# Patient Record
Sex: MALE | Race: OTHER
[De-identification: names, ages, dates, MRNs, and addresses within clinical notes are randomized per-mention and may not be internally consistent; named-entity substitution may affect disease eponyms.]

---

## 2018-10-22 ENCOUNTER — HOSPITAL ENCOUNTER (EMERGENCY)
Dept: HOSPITAL 62 - ER | Age: 38
Discharge: LEFT BEFORE DISCHARGE | End: 2018-10-22

## 2018-10-22 VITALS — DIASTOLIC BLOOD PRESSURE: 80 MMHG | SYSTOLIC BLOOD PRESSURE: 137 MMHG

## 2018-10-22 DIAGNOSIS — Z53.21: Primary | ICD-10-CM

## 2018-10-22 PROCEDURE — 93010 ELECTROCARDIOGRAM REPORT: CPT

## 2018-10-22 PROCEDURE — 93005 ELECTROCARDIOGRAM TRACING: CPT

## 2018-10-23 ENCOUNTER — HOSPITAL ENCOUNTER (EMERGENCY)
Dept: HOSPITAL 62 - ER | Age: 38
Discharge: HOME | End: 2018-10-23
Payer: SELF-PAY

## 2018-10-23 VITALS — SYSTOLIC BLOOD PRESSURE: 118 MMHG | DIASTOLIC BLOOD PRESSURE: 67 MMHG

## 2018-10-23 DIAGNOSIS — K29.00: Primary | ICD-10-CM

## 2018-10-23 DIAGNOSIS — T47.1X6A: ICD-10-CM

## 2018-10-23 DIAGNOSIS — F17.200: ICD-10-CM

## 2018-10-23 DIAGNOSIS — Z91.14: ICD-10-CM

## 2018-10-23 DIAGNOSIS — K21.9: ICD-10-CM

## 2018-10-23 DIAGNOSIS — Z91.128: ICD-10-CM

## 2018-10-23 DIAGNOSIS — R07.89: ICD-10-CM

## 2018-10-23 LAB
ADD MANUAL DIFF: NO
ALBUMIN SERPL-MCNC: 4.2 G/DL (ref 3.5–5)
ALP SERPL-CCNC: 109 U/L (ref 38–126)
ALT SERPL-CCNC: 29 U/L (ref 21–72)
ANION GAP SERPL CALC-SCNC: 11 MMOL/L (ref 5–19)
APPEARANCE UR: (no result)
APTT PPP: YELLOW S
AST SERPL-CCNC: 26 U/L (ref 17–59)
BASOPHILS # BLD AUTO: 0.1 10^3/UL (ref 0–0.2)
BASOPHILS NFR BLD AUTO: 0.9 % (ref 0–2)
BILIRUB DIRECT SERPL-MCNC: 0.2 MG/DL (ref 0–0.4)
BILIRUB SERPL-MCNC: 0.5 MG/DL (ref 0.2–1.3)
BILIRUB UR QL STRIP: NEGATIVE
BUN SERPL-MCNC: 13 MG/DL (ref 7–20)
CALCIUM: 9.9 MG/DL (ref 8.4–10.2)
CHLORIDE SERPL-SCNC: 101 MMOL/L (ref 98–107)
CO2 SERPL-SCNC: 30 MMOL/L (ref 22–30)
EOSINOPHIL # BLD AUTO: 0.2 10^3/UL (ref 0–0.6)
EOSINOPHIL NFR BLD AUTO: 1.9 % (ref 0–6)
ERYTHROCYTE [DISTWIDTH] IN BLOOD BY AUTOMATED COUNT: 14.7 % (ref 11.5–14)
GLUCOSE SERPL-MCNC: 117 MG/DL (ref 75–110)
GLUCOSE UR STRIP-MCNC: NEGATIVE MG/DL
HCT VFR BLD CALC: 48 % (ref 37.9–51)
HGB BLD-MCNC: 16.6 G/DL (ref 13.5–17)
KETONES UR STRIP-MCNC: NEGATIVE MG/DL
LIPASE SERPL-CCNC: 113.1 U/L (ref 23–300)
LYMPHOCYTES # BLD AUTO: 3.3 10^3/UL (ref 0.5–4.7)
LYMPHOCYTES NFR BLD AUTO: 26.9 % (ref 13–45)
MCH RBC QN AUTO: 29.1 PG (ref 27–33.4)
MCHC RBC AUTO-ENTMCNC: 34.5 G/DL (ref 32–36)
MCV RBC AUTO: 84 FL (ref 80–97)
MONOCYTES # BLD AUTO: 0.8 10^3/UL (ref 0.1–1.4)
MONOCYTES NFR BLD AUTO: 6.5 % (ref 3–13)
NEUTROPHILS # BLD AUTO: 7.9 10^3/UL (ref 1.7–8.2)
NEUTS SEG NFR BLD AUTO: 63.8 % (ref 42–78)
NITRITE UR QL STRIP: NEGATIVE
PH UR STRIP: 5 [PH] (ref 5–9)
PLATELET # BLD: 245 10^3/UL (ref 150–450)
POTASSIUM SERPL-SCNC: 4.1 MMOL/L (ref 3.6–5)
PROT SERPL-MCNC: 7.3 G/DL (ref 6.3–8.2)
PROT UR STRIP-MCNC: 100 MG/DL
RBC # BLD AUTO: 5.69 10^6/UL (ref 4.35–5.55)
SODIUM SERPL-SCNC: 141.8 MMOL/L (ref 137–145)
SP GR UR STRIP: 1.02
TOTAL CELLS COUNTED % (AUTO): 100 %
UROBILINOGEN UR-MCNC: 2 MG/DL (ref ?–2)
WBC # BLD AUTO: 12.4 10^3/UL (ref 4–10.5)

## 2018-10-23 PROCEDURE — 36415 COLL VENOUS BLD VENIPUNCTURE: CPT

## 2018-10-23 PROCEDURE — 99284 EMERGENCY DEPT VISIT MOD MDM: CPT

## 2018-10-23 PROCEDURE — 84484 ASSAY OF TROPONIN QUANT: CPT

## 2018-10-23 PROCEDURE — 81001 URINALYSIS AUTO W/SCOPE: CPT

## 2018-10-23 PROCEDURE — 80053 COMPREHEN METABOLIC PANEL: CPT

## 2018-10-23 PROCEDURE — 83690 ASSAY OF LIPASE: CPT

## 2018-10-23 PROCEDURE — 93010 ELECTROCARDIOGRAM REPORT: CPT

## 2018-10-23 PROCEDURE — 85025 COMPLETE CBC W/AUTO DIFF WBC: CPT

## 2018-10-23 PROCEDURE — 93005 ELECTROCARDIOGRAM TRACING: CPT

## 2018-10-23 NOTE — EKG REPORT
SEVERITY:- BORDERLINE ECG -

SINUS RHYTHM

BORDERLINE INFERIOR Q WAVES

:

Confirmed by: Nury Morales 23-Oct-2018 08:24:03

## 2018-10-23 NOTE — ER DOCUMENT REPORT
ED General





- General


Chief Complaint: Abdominal Pain


Stated Complaint: ABDOMINAL PAIN


Time Seen by Provider: 10/23/18 20:18


Mode of Arrival: Ambulatory


Information source: Patient


TRAVEL OUTSIDE OF THE [place] IN LAST 30 DAYS: No





- HPI


Notes: 





Patient is a pleasant 38-year-old  male presents to the emergency 

department with report of a history of reflux and gastritis, previously was on 

scopolamine and pantoprazole back in [place], but he has run out of his supply 

of those medications here.  The patient reports that for the last 2 days he has 

had midepigastric pain with belching and nausea with vomiting without 

hematemesis.  The patient reports the pain is mainly midepigastric but did 

radiate to the lower chest wall yesterday.  The patient denies any fever or 

chills or constipation or diarrhea or melena or dysuria.  No cough or 

congestion.





The patient took ibuprofen 2 days ago prior to the abdominal pain worsening.  

He also became stressed from an argument which seemed to make the abdominal 

pain worse.





Mother with a heart attack at age 56, father  of a stroke.





- Related Data


Allergies/Adverse Reactions: 


 





No Known Allergies Allergy (Verified 10/22/18 23:02)


 











Past Medical History





- General


Information source: Patient





- Social History


Smoking Status: Current Every Day Smoker


Frequency of alcohol use: None


Drug Abuse: None


Lives with: Friend


Family History: CAD





Review of Systems





- Review of Systems


-: Yes All other systems reviewed and negative





Physical Exam





- Vital signs


Vitals: 


 











Temp Pulse Resp BP Pulse Ox


 


 98.4 F   94   18   125/78   96 


 


 10/23/18 19:46  10/23/18 19:46  10/23/18 19:46  10/23/18 19:46  10/23/18 19:46














- Notes


Notes: 





PHYSICAL EXAMINATION:





GENERAL: Well-appearing, well-nourished and in no acute distress.





HEAD: Atraumatic, normocephalic.





EYES: Pupils equal round and reactive to light, extraocular movements intact, 

sclera anicteric, conjunctiva are normal.





ENT: Nares patent, oropharynx clear without exudates.  Moist mucous membranes.





NECK: Normal range of motion, supple without lymphadenopathy





LUNGS: Breath sounds clear to auscultation bilaterally and equal.  No wheezes 

rales or rhonchi.





HEART: Regular rate and rhythm without murmurs





ABDOMEN: Soft, nondistended abdomen.  No guarding, no rebound.  No masses 

appreciated.  Reproducible pain through the midepigastric region.  Negative 

Schulz's.  No obvious hepatosplenomegaly.





Musculoskeletal: Normal range of motion, no pitting or edema.  No cyanosis.





NEUROLOGICAL: Cranial nerves grossly intact.  Normal speech, normal gait.  

Normal sensory, motor exams 





PSYCH: Normal mood, normal affect.





SKIN: Warm, Dry, normal turgor, no rashes or lesions noted.





Course





- Re-evaluation


Re-evalutation: 





10/23/18 22:20


Patient was given Pepcid, Maalox, and Carafate with complete relief of his 

discomfort.  He denied any further nausea.  He tolerated p.o. fluids and felt 

stable for discharge.





No evidence for pancreatitis or hepatitis or acute MI or ischemia or 

electrolyte imbalance or GI bleed or diabetes.  Given the resolution of pain, 

there is no suspicion for small bowel obstruction or bowel perforation.





- Vital Signs


Vital signs: 


 











Temp Pulse Resp BP Pulse Ox


 


 98.4 F   78   16   118/67   95 


 


 10/23/18 21:53  10/23/18 21:53  10/23/18 21:53  10/23/18 21:53  10/23/18 21:53














- Laboratory


Result Diagrams: 


 10/23/18 20:20





 10/23/18 20:20


Laboratory results interpreted by me: 


 











  10/23/18 10/23/18 10/23/18





  20:20 20:20 20:20


 


WBC  12.4 H  


 


RBC  5.69 H  


 


RDW  14.7 H  


 


Glucose   117 H 


 


Urine Protein    100 H


 


Urine Blood    SMALL H


 


Urine Urobilinogen    2.0 H














- EKG Interpretation by Me


EKG shows normal: Sinus rhythm


Additional EKG results interpreted by me: 





10/23/18 22:21


EKG as interpreted by me showed normal sinus rhythm rate of 82.  No gross 

evidence for acute MI or ischemia.  No change from previous EKG reviewed.  

Normal axes.





Discharge





- Discharge


Clinical Impression: 


Gastritis


Qualifiers:


 Gastritis type: unspecified gastritis Chronicity: acute Gastritis bleeding: 

without bleeding Qualified Code(s): K29.00 - Acute gastritis without bleeding





Condition: Stable


Disposition: HOME, SELF-CARE


Instructions:  Abdominal Pain ([facility]), Gastritis ([facility])


Additional Instructions: 


No ibuprofen or anti-inflammatories.


Prescriptions: 


Metoclopramide HCl [Reglan 10 mg Tablet] 10 mg PO Q8HP PRN #15 tablet


 PRN Reason: 


Dicyclomine HCl [Bentyl 10 mg Capsule] 1 cap PO TIDP PRN #20 cap


 PRN Reason: 


Pantoprazole Sodium 20 mg PO DAILY #30 tablet.dr


Forms:  Return to Work


Print Language: [place] citizen